# Patient Record
Sex: FEMALE
[De-identification: names, ages, dates, MRNs, and addresses within clinical notes are randomized per-mention and may not be internally consistent; named-entity substitution may affect disease eponyms.]

---

## 2023-01-02 ENCOUNTER — NURSE TRIAGE (OUTPATIENT)
Dept: OTHER | Facility: CLINIC | Age: 88
End: 2023-01-02

## 2023-01-02 NOTE — TELEPHONE ENCOUNTER
Location of patient: [place]  call from [facility] with [facility]. Joce Gordillo MRN: [id number]    Subjective: Caller states \"Her leg is red\"     Current Symptoms: R leg swollen and red. No chest pain or SOB. Water blisters are draining. Redness is from ankle up 4 inches. Blisters are from her ankle to her knee. History of cellulitis. Associated Symptoms: reduced activity    Pain Severity:  unable to touch legs due to pain /10; ; constant    Temperature: denies fever     What has been tried:     Recommended disposition: See HCP (or PCP Triage) Within 4 Hours    Care advice provided, patient verbalizes understanding; denies any other questions or concerns. Outcome: Patient/caller agrees to proceed to [facility] on [address] [address] Emergency Department. Courtesy call placed to, Dr. Elmo Palomino, on-call provider.       Reason for Disposition   SEVERE leg swelling (e.g., swelling extends above knee, entire leg is swollen, weeping fluid)    Protocols used: Leg Swelling and Edema-ADULT-